# Patient Record
Sex: MALE | Race: WHITE
[De-identification: names, ages, dates, MRNs, and addresses within clinical notes are randomized per-mention and may not be internally consistent; named-entity substitution may affect disease eponyms.]

---

## 2019-01-06 ENCOUNTER — HOSPITAL ENCOUNTER (EMERGENCY)
Dept: HOSPITAL 25 - ED | Age: 44
Discharge: HOME | End: 2019-01-06
Payer: SELF-PAY

## 2019-01-06 VITALS — DIASTOLIC BLOOD PRESSURE: 72 MMHG | SYSTOLIC BLOOD PRESSURE: 138 MMHG
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
## 2019-01-06 DIAGNOSIS — Z88.0: ICD-10-CM

## 2019-01-06 DIAGNOSIS — S00.93XA: ICD-10-CM

## 2019-01-06 DIAGNOSIS — S01.01XA: Primary | ICD-10-CM
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
## 2019-01-06 PROCEDURE — 12001 RPR S/N/AX/GEN/TRNK 2.5CM/<: CPT

## 2019-01-06 PROCEDURE — 12002 RPR S/N/AX/GEN/TRNK2.6-7.5CM: CPT

## 2019-01-06 PROCEDURE — 99282 EMERGENCY DEPT VISIT SF MDM: CPT

## 2019-01-06 PROCEDURE — 70450 CT HEAD/BRAIN W/O DYE: CPT
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
## 2019-01-17 ENCOUNTER — HOSPITAL ENCOUNTER (EMERGENCY)
Dept: HOSPITAL 25 - ED | Age: 44
Discharge: HOME | End: 2019-01-17
Payer: SELF-PAY

## 2019-01-17 VITALS — SYSTOLIC BLOOD PRESSURE: 129 MMHG | DIASTOLIC BLOOD PRESSURE: 81 MMHG
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
## 2019-01-17 DIAGNOSIS — Z48.02: Primary | ICD-10-CM

## 2019-01-17 DIAGNOSIS — S01.91XD: ICD-10-CM

## 2019-01-17 PROCEDURE — 99282 EMERGENCY DEPT VISIT SF MDM: CPT
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
